# Patient Record
Sex: FEMALE
[De-identification: names, ages, dates, MRNs, and addresses within clinical notes are randomized per-mention and may not be internally consistent; named-entity substitution may affect disease eponyms.]

---

## 2019-06-19 ENCOUNTER — HOSPITAL ENCOUNTER (OUTPATIENT)
Dept: HOSPITAL 5 - ECHO | Age: 54
Discharge: HOME | End: 2019-06-19
Attending: INTERNAL MEDICINE
Payer: COMMERCIAL

## 2019-06-19 ENCOUNTER — HOSPITAL ENCOUNTER (OUTPATIENT)
Dept: HOSPITAL 5 - LAB | Age: 54
Discharge: HOME | End: 2019-06-19
Attending: INTERNAL MEDICINE
Payer: SELF-PAY

## 2019-06-19 DIAGNOSIS — Z13.6: Primary | ICD-10-CM

## 2019-06-19 DIAGNOSIS — Z13.21: ICD-10-CM

## 2019-06-19 DIAGNOSIS — Z13.29: ICD-10-CM

## 2019-06-19 DIAGNOSIS — Z13.220: Primary | ICD-10-CM

## 2019-06-19 DIAGNOSIS — I10: ICD-10-CM

## 2019-06-19 LAB
ALBUMIN SERPL-MCNC: 4.6 G/DL (ref 3.9–5)
ALT SERPL-CCNC: 12 UNITS/L (ref 7–56)
BUN SERPL-MCNC: 12 MG/DL (ref 7–17)
BUN/CREAT SERPL: 20 %
CALCIUM SERPL-MCNC: 9.3 MG/DL (ref 8.4–10.2)
HCT VFR BLD CALC: 42.1 % (ref 30.3–42.9)
HDLC SERPL-MCNC: 61 MG/DL (ref 40–59)
HEMOLYSIS INDEX: 37
HGB BLD-MCNC: 14.6 GM/DL (ref 10.1–14.3)
MCHC RBC AUTO-ENTMCNC: 35 % (ref 30–34)
MCV RBC AUTO: 94 FL (ref 79–97)
PLATELET # BLD: 241 K/MM3 (ref 140–440)
RBC # BLD AUTO: 4.47 M/MM3 (ref 3.65–5.03)

## 2019-06-19 PROCEDURE — 93880 EXTRACRANIAL BILAT STUDY: CPT

## 2019-06-19 PROCEDURE — 80053 COMPREHEN METABOLIC PANEL: CPT

## 2019-06-19 PROCEDURE — 75571 CT HRT W/O DYE W/CA TEST: CPT

## 2019-06-19 PROCEDURE — 83036 HEMOGLOBIN GLYCOSYLATED A1C: CPT

## 2019-06-19 PROCEDURE — 36415 COLL VENOUS BLD VENIPUNCTURE: CPT

## 2019-06-19 PROCEDURE — 80061 LIPID PANEL: CPT

## 2019-06-19 PROCEDURE — 93306 TTE W/DOPPLER COMPLETE: CPT

## 2019-06-19 PROCEDURE — 85027 COMPLETE CBC AUTOMATED: CPT

## 2019-06-19 PROCEDURE — 82306 VITAMIN D 25 HYDROXY: CPT

## 2019-06-19 PROCEDURE — 82607 VITAMIN B-12: CPT

## 2019-06-19 PROCEDURE — 93017 CV STRESS TEST TRACING ONLY: CPT

## 2019-06-19 PROCEDURE — 84443 ASSAY THYROID STIM HORMONE: CPT

## 2019-06-22 LAB — VITAMIN D2 SERPL-MCNC: <4 NG/ML

## 2019-06-24 NOTE — TREADMILL REPORT
TREADMILL STRESS TEST.



The patient exercised on Raymundo protocol for 8 minutes 43 seconds.  The patient's

baseline heart rate is 77.  Baseline blood pressure 126/78.  Baseline EKG, sinus

rhythm with sinus APCs.  The patient had no EKG changes for ischemia, went up to

stage III on Raymundo protocol and achieved max heart rate 147, which is 86% max

predicted heart rate.  Peak blood pressure 149/84.  The patient had no EKG

changes or rhythm suggestive of ischemia.



SUMMARY:

1.  Negative treadmill EKG.

2.  Good exercise capacity 8 minutes 43 seconds on Raymundo protocol.

3.  No exaggerated BP response to exercise.

4.  No EKG changes or arrhythmia suggestive of ischemia.





DD: 06/24/2019 09:38

DT: 06/24/2019 11:47

JOB# [id number]  [id number]

DOM/DALE 5

## 2019-06-25 NOTE — CAT SCAN REPORT
LIMITED CHEST CT SCAN FOR CALCIUM SCORING:



History: Screening.



A limited chest CT scan was carried out for calcium evaluation

of the coronary arteries.  This dictation is for the non-cardiac

portion of the chest which was included.  There is no hilar or

mediastinal mass seen.  The visualized lungs are expanded and clear. No 

evidence for parenchymal disease, nodule or infiltrate. No abnormality 

is seen in the included portion of the upper abdomen. Bilateral breast 

prostheses are intact.



IMPRESSION:

No significant abnormality in the lower chest.

## 2019-06-25 NOTE — VASCULAR LAB REPORT
PROCEDURE:  VL CAROTID DUPLEX BILAT 

 

TECHNIQUE:  Carotid duplex Doppler ultrasound. Grayscale, color flow and spectral waveform imaging pe
rformed.  

 

HISTORY:  SCREENING. History of hypertension. 

 

COMPARISON: None 

 

FINDINGS: 

 

There is mild plaque seen bilaterally proximal ICAs. 

There is no abnormal elevation of carotid flow velocity to indicate hemodynamically significant steno
sis. Specifically findings correspond to less than 50% stenosis bilaterally. 

Vertebral artery flow is antegrade bilaterally. 

 

 

IMPRESSION:  No evidence of any hemodynamically significant stenosis. 

 

This document is electronically signed by Zita Simeon MD., June 25 2019 10:56:35 AM ET

## 2019-06-26 NOTE — CT CALCIUM SCORING REPORT
Coronary Calcium Score


Date of service: 06/26/19


Procedure: 


High-resolution computed tomographic imaging of the chest was performed 

on06/19/19 with particular attention paid to the coronary arteries.  Images from

the examination were analyzed for the presence and extent of coronary artery 

calcification, using coronary calcium quantification software.  The patient 

tolerated the procedure well and there were no complications.  The results of 

the coronary calcification analysis are provided below.  The patient scores are 

compared with published data related to scores for people of a similar age and 

the same gender.








- Findings


Total Agatson Score: 0


Findings: 





mild calcification in ascending and descending aorta.
Fall with Harm Risk